# Patient Record
(demographics unavailable — no encounter records)

---

## 2024-10-23 NOTE — PHYSICAL EXAM
[Well-appearing] : well-appearing [No dysmorphic facial features] : no dysmorphic facial features [No ocular abnormalities] : no ocular abnormalities [Neck supple] : neck supple [Soft] : soft [No organomegaly] : no organomegaly [Straight] : straight [No adilene or dimples] : no adilene or dimples [No deformities] : no deformities [Alert] : alert [Well related, good eye contact] : well related, good eye contact [Pupils reactive to light] : pupils reactive to light [Turns to light] : turns to light [Tracks face, light or objects with full extraocular movements] : tracks face, light or objects with full extraocular movements [Responds to touch on face] : responds to touch on face [No facial asymmetry or weakness] : no facial asymmetry or weakness [No nystagmus] : no nystagmus [Responds to voice/sounds] : responds to voice/sounds [Good shoulder shrug] : good shoulder shrug [Midline tongue] : midline tongue [No fasciculations] : no fasciculations [Normal axial and appendicular muscle tone with symmetric limb movements] : normal axial and appendicular muscle tone with symmetric limb movements [Normal bulk] : normal bulk [Reaches for toys and or gives high five] : reaches for toys and or gives high five [Good  bilaterally] : good  bilaterally [5/5 strength in proximal and distal muscles of arms and legs] : 5/5 strength in proximal and distal muscles of arms and legs [No abnormal involuntary movements] : no abnormal involuntary movements [Walks well for age] : walks well for age [Good stoop and recover] : good stoop and recover [2+ biceps] : 2+ biceps [Triceps] : triceps [Knee jerks] : knee jerks [Responds to touch and tickle] : responds to touch and tickle [No dysmetria in reaching for objects and or on FTNT] : no dysmetria in reaching for objects and or on FTNT [Good standing and or walking balance for age, no ataxia] : good standing and or walking balance for age, no ataxia [de-identified] : Small head circumference  [de-identified] : Anxious but easily soothed by Mom or Grandmom. Playful with both, smiling appropriately. [de-identified] : Nonverbal. Occasional humming to himself. Follows directions in Citizen of the [place] including identifying body parts.

## 2024-10-23 NOTE — ASSESSMENT
[FreeTextEntry1] : 2 year old with Expressive language delay and exam significant for baseline small head size. Discussed with family that further workup recommended. This would include MRI brain to assess for structural etiology of small head size and speech delay. VEEG to assess for nonconvulsive seizure or LandauKleffner that could contribute to expressive language delay. Genetic testing. Mom is comfortable with completed blood work but does not want to pursue imaging or EEG at this time.  1. Chromosomal studies, Microarray to be sent today 2. Recommend formal Audiology testing

## 2024-10-23 NOTE — HISTORY OF PRESENT ILLNESS
[FreeTextEntry1] : Tonny presents with Mom and Grandmom in follow up. He remains nonverbal though continues to advance with his receptive language. He makes sounds to himself and does not babble. He is not pointing or gesturing. He does point to some body parts and mimics animal sounds when asked. Socially they feel his eye contact is inconsistent. He does play with toys and shares in his play. He likes looking through picture books and pointing at items. Motor milestones are normal. He does not have any repetitive behaviors.

## 2024-10-23 NOTE — REASON FOR VISIT
[Follow-Up Evaluation] : a follow-up evaluation for [Mother] : mother [Family Member] : family member

## 2025-01-17 NOTE — ASSESSMENT
[FreeTextEntry1] : 2 year old with improved head circumference but persistent expressive language delay. Will continue with consistent Speech therapy services

## 2025-01-17 NOTE — HISTORY OF PRESENT ILLNESS
[FreeTextEntry1] : Tonny presents in follow-up in the presence of grandmother.  Grandmom states that Tonny over the last 2 weeks or so has started to say some single words in [place] only.  He continues to improve with his receptive language.  He is playful and interactive. He has not developed any usual extremity movements.  Lab work including chromosomal studies completed following the last visit and previously discussed with mom via telephone.  MRI brain not completed due to mom's discomfort about his needing sedation.

## 2025-01-17 NOTE — PHYSICAL EXAM
[Well-appearing] : well-appearing [Normocephalic] : normocephalic [No dysmorphic facial features] : no dysmorphic facial features [No ocular abnormalities] : no ocular abnormalities [Neck supple] : neck supple [Soft] : soft [No organomegaly] : no organomegaly [Straight] : straight [No adilene or dimples] : no adilene or dimples [No deformities] : no deformities [Alert] : alert [Well related, good eye contact] : well related, good eye contact [Pupils reactive to light] : pupils reactive to light [Turns to light] : turns to light [Tracks face, light or objects with full extraocular movements] : tracks face, light or objects with full extraocular movements [Responds to touch on face] : responds to touch on face [No facial asymmetry or weakness] : no facial asymmetry or weakness [No nystagmus] : no nystagmus [Responds to voice/sounds] : responds to voice/sounds [Good shoulder shrug] : good shoulder shrug [Midline tongue] : midline tongue [No fasciculations] : no fasciculations [Normal axial and appendicular muscle tone with symmetric limb movements] : normal axial and appendicular muscle tone with symmetric limb movements [Normal bulk] : normal bulk [Reaches for toys and or gives high five] : reaches for toys and or gives high five [Good  bilaterally] : good  bilaterally [5/5 strength in proximal and distal muscles of arms and legs] : 5/5 strength in proximal and distal muscles of arms and legs [No abnormal involuntary movements] : no abnormal involuntary movements [Walks well for age] : walks well for age [Good stoop and recover] : good stoop and recover [2+ biceps] : 2+ biceps [Triceps] : triceps [Knee jerks] : knee jerks [Responds to touch and tickle] : responds to touch and tickle [No dysmetria in reaching for objects and or on FTNT] : no dysmetria in reaching for objects and or on FTNT [Good standing and or walking balance for age, no ataxia] : good standing and or walking balance for age, no ataxia [de-identified] : Anxious at baseline intermittently interactive [de-identified] : Nonverbal. Follows directions in [place]

## 2025-06-13 NOTE — CONSULT LETTER
[Dear  ___] : Dear  [unfilled], [Consult Letter:] : I had the pleasure of evaluating your patient, [unfilled]. [Please see my note below.] : Please see my note below. [Consult Closing:] : Thank you very much for allowing me to participate in the care of this patient.  If you have any questions, please do not hesitate to contact me. [Sincerely,] : Sincerely, [FreeTextEntry2] : Dr Quinonez [FreeTextEntry3] : Destiny Aguilera MD Pediatric Hematology/Oncology [place] [phone]

## 2025-06-13 NOTE — REASON FOR VISIT
[New Patient/Consultation] : a new patient/consultation for [Mother] : mother [FreeTextEntry2] : microcytic anemia

## 2025-06-13 NOTE — CONSULT LETTER
[Dear  ___] : Dear  [unfilled], [Consult Letter:] : I had the pleasure of evaluating your patient, [unfilled]. [Please see my note below.] : Please see my note below. [Consult Closing:] : Thank you very much for allowing me to participate in the care of this patient.  If you have any questions, please do not hesitate to contact me. [Sincerely,] : Sincerely, [FreeTextEntry2] : Dr Quinonez [FreeTextEntry3] : Destiny Aguilera MD Pediatric Hematology/Oncology [place] [phone]

## 2025-06-13 NOTE — PAST MEDICAL HISTORY
[At Term] : at term [[place] [ Section] : by  section [None] : there were no delivery complications [Jaundice] :  jaundice [Phototherapy] : phototherapy [Occupational Therapy] : occupational therapy [Speech Therapy] : speech therapy [Transfusion] : no transfusion [NICU] : no NICU [FreeTextEntry4] : emergency csection - mom had eclampsia, baby went into distress, +jaundice at birth, phototherapy x2 days  [FreeTextEntry3] : IE services for speech, OT

## 2025-06-13 NOTE — REVIEW OF SYSTEMS
[Pallor] : pallor [Fever] : no fever [Decreased Appetite] : normal appetite [Fatigue] : no fatigue [Weakness] : no weakness [Weight Change] : no weight change [Rash] : no rash [Petechiae] : no petechiae [Ecchymoses] : no ecchymoses [Jaundice] : no jaundice [Icterus] : no icterus [Nasal Discharge] : no nasal discharge [Epistaxis] : no epistaxis [Mouth Ulcers] : no mouth ulcers [Bleeding] : no bleeding [Bruising] : no bruising [Adenopathy] : no adenopathy [Frequent Infections] : no frequent infections [Cough] : no cough [Murmur] : no murmur [Abdominal Pain] : no abdominal pain [Nausea] : no nausea [Emesis] : no emesis [Constipation] : no constipation [Diarrhea] : no diarrhea [Hematuria] : no hematuria [Joint Pain] : no joint pain [Joint Swelling] : no joint swelling [Headache] : no headache [Zhao] : not zhao [Irritable] : not irritable [FreeTextEntry1] : mild anemia noted in the past

## 2025-06-13 NOTE — HISTORY OF PRESENT ILLNESS
[No Feeding Issues] : no feeding issues at this time [de-identified] : This is an initial consult visit for this 3 y/o male referred by pediatrician for evaluation of microcytic anemia.  Mother states that patient had bloodwork done for routine well visit at one year of age and reports that she was told that hgb and mcv were low.  States that patient took MVI with iron, twice (over the past year) for less than a month at a time, and repeat lab work showed similar results.  Mother states that patient has been well.  Denies recent fever, illness, fatigue, abdominal pain, vomiting or diarrhea.  Denies abnormal bruising or bleeding.  No h/o rash, joint pain or joint swelling.  Reports good appetite and good energy level.  Eats a varied diet including meats, fruits and vegetables.    Meds: none  Psx: hypospadias repair

## 2025-06-13 NOTE — END OF VISIT
[FreeTextEntry3] : Patient seen and examined. 3 yo referred for microcytic anemia.  Has taken mvi with iron intermittently in the past without change in values per mother.  Will check iron studies, cbc,retic, hgb elec and alpha gene mutation.  Grandma requested to check some vitamin levels as well. If iron deficient, may not have fully replenished iron stores with duration and dose of iron supplement.  Labs today and f/u in 1-2 weeks to review lab results and for further treatment planning as needed. >50% encounter by attending.

## 2025-06-13 NOTE — END OF VISIT
[FreeTextEntry3] : Patient seen and examined. 1 yo referred for microcytic anemia.  Has taken mvi with iron intermittently in the past without change in values per mother.  Will check iron studies, cbc,retic, hgb elec and alpha gene mutation.  Grandma requested to check some vitamin levels as well. If iron deficient, may not have fully replenished iron stores with duration and dose of iron supplement.  Labs today and f/u in 1-2 weeks to review lab results and for further treatment planning as needed. >50% encounter by attending.

## 2025-06-13 NOTE — HISTORY OF PRESENT ILLNESS
[No Feeding Issues] : no feeding issues at this time [de-identified] : This is an initial consult visit for this 1 y/o male referred by pediatrician for evaluation of microcytic anemia.  Mother states that patient had bloodwork done for routine well visit at one year of age and reports that she was told that hgb and mcv were low.  States that patient took MVI with iron, twice (over the past year) for less than a month at a time, and repeat lab work showed similar results.  Mother states that patient has been well.  Denies recent fever, illness, fatigue, abdominal pain, vomiting or diarrhea.  Denies abnormal bruising or bleeding.  No h/o rash, joint pain or joint swelling.  Reports good appetite and good energy level.  Eats a varied diet including meats, fruits and vegetables.    Meds: none  Psx: hypospadias repair

## 2025-06-13 NOTE — HISTORY OF PRESENT ILLNESS
[No Feeding Issues] : no feeding issues at this time [de-identified] : This is an initial consult visit for this 3 y/o male referred by pediatrician for evaluation of microcytic anemia.  Mother states that patient had bloodwork done for routine well visit at one year of age and reports that she was told that hgb and mcv were low.  States that patient took MVI with iron, twice (over the past year) for less than a month at a time, and repeat lab work showed similar results.  Mother states that patient has been well.  Denies recent fever, illness, fatigue, abdominal pain, vomiting or diarrhea.  Denies abnormal bruising or bleeding.  No h/o rash, joint pain or joint swelling.  Reports good appetite and good energy level.  Eats a varied diet including meats, fruits and vegetables.    Meds: none  Psx: hypospadias repair

## 2025-06-13 NOTE — CONSULT LETTER
[Dear  ___] : Dear  [unfilled], [Consult Letter:] : I had the pleasure of evaluating your patient, [unfilled]. [Please see my note below.] : Please see my note below. [Consult Closing:] : Thank you very much for allowing me to participate in the care of this patient.  If you have any questions, please do not hesitate to contact me. [Sincerely,] : Sincerely, [FreeTextEntry2] : Dr Quinonez [FreeTextEntry3] : Destiny Aguilera MD Pediatric Hematology/Oncology [place] [phone]

## 2025-07-03 NOTE — REASON FOR VISIT
[Follow-Up Visit] : a follow-up visit for [Iron Deficiency Anemia] : iron deficiency anemia [Mother] : mother [Family Member] : family member

## 2025-07-04 NOTE — END OF VISIT
[FreeTextEntry3] : pt seen and examined.  reviewed labs.  pt has not yet started iron supplement. reviewed dosing with mother and recommendation for compliance with plan.  f/u 1 month or sooner with any concerns. >50% encounter by attending [Time Spent: ___ minutes] : I have spent [unfilled] minutes of time on the encounter which excludes teaching and separately reported services.

## 2025-07-04 NOTE — CONSULT LETTER
[Dear  ___] : Dear  [unfilled], [Courtesy Letter:] : I had the pleasure of seeing your patient, [unfilled], in my office today. [Please see my note below.] : Please see my note below. [Consult Closing:] : Thank you very much for allowing me to participate in the care of this patient.  If you have any questions, please do not hesitate to contact me. [Sincerely,] : Sincerely, [FreeTextEntry2] : Dr Quinonez [FreeTextEntry3] : Destiny Aguilera MD Pediatric Hematology/Oncology [place] [phone]

## 2025-07-04 NOTE — HISTORY OF PRESENT ILLNESS
[No Feeding Issues] : no feeding issues at this time [de-identified] : 3 y/o male with iron deficiency anemia here in clinic today for scheduled visit.  Mother states that she did not start the iron that was prescribed after last visit.  States that when she went to pick it up from pharmacy she was told that the dose was too strong of a dose for patient's age.  It was reviewed with parent in clinic today that the dose was correct for patient's weight and for treatment of iron deficiency anemia.  Mother reports that Tonny has been well.  Denies fever or recent illness.  Continues to have a good appetite and good energy level.  No reports of abnormal bruising or bleeding.  No acute concerns.

## 2025-07-04 NOTE — CONSULT LETTER
[Dear  ___] : Dear  [unfilled], [Courtesy Letter:] : I had the pleasure of seeing your patient, [unfilled], in my office today. [Please see my note below.] : Please see my note below. [Consult Closing:] : Thank you very much for allowing me to participate in the care of this patient.  If you have any questions, please do not hesitate to contact me. [Sincerely,] : Sincerely, [FreeTextEntry2] : Dr Quinonez [FreeTextEntry3] : Destiny Aguilera MD Pediatric Hematology/Oncology [place] [phone]

## 2025-07-04 NOTE — HISTORY OF PRESENT ILLNESS
[No Feeding Issues] : no feeding issues at this time [de-identified] : 3 y/o male with iron deficiency anemia here in clinic today for scheduled visit.  Mother states that she did not start the iron that was prescribed after last visit.  States that when she went to pick it up from pharmacy she was told that the dose was too strong of a dose for patient's age.  It was reviewed with parent in clinic today that the dose was correct for patient's weight and for treatment of iron deficiency anemia.  Mother reports that Tonny has been well.  Denies fever or recent illness.  Continues to have a good appetite and good energy level.  No reports of abnormal bruising or bleeding.  No acute concerns.

## 2025-07-29 NOTE — HISTORY OF PRESENT ILLNESS
[de-identified] : This is a scheduled visit for this 3 y/o male with MALINI.  Patient prescribed Novaferrum 3ml daily.  Mother states that patient has been tolerating supplement.  Notes that he had been taking it daily however they were on vacation last week and he missed about 5 doses out of the week.  Has been well. Denies recent fever or illness.  No reports of abdominal pain, vomiting or diarrhea.  No unusual bruising or bleeding.  Eating well and reports good energy level.  No acute concerns.  [No Feeding Issues] : no feeding issues at this time

## 2025-07-29 NOTE — HISTORY OF PRESENT ILLNESS
[de-identified] : This is a scheduled visit for this 3 y/o male with MALINI.  Patient prescribed Novaferrum 3ml daily.  Mother states that patient has been tolerating supplement.  Notes that he had been taking it daily however they were on vacation last week and he missed about 5 doses out of the week.  Has been well. Denies recent fever or illness.  No reports of abdominal pain, vomiting or diarrhea.  No unusual bruising or bleeding.  Eating well and reports good energy level.  No acute concerns.  [No Feeding Issues] : no feeding issues at this time

## 2025-07-29 NOTE — REVIEW OF SYSTEMS
[Fever] : no fever [Fatigue] : no fatigue [Weakness] : no weakness [Weight Change] : no weight change [Petechiae] : no petechiae [Ecchymoses] : no ecchymoses [Jaundice] : no jaundice [Icterus] : no icterus [Nasal Discharge] : no nasal discharge [Epistaxis] : no epistaxis [Sore Throat] : no sore throat [Mouth Ulcers] : no mouth ulcers [Pallor] : no pallor [Bleeding] : no bleeding [Bruising] : no bruising [Adenopathy] : no adenopathy [Anemia] : no anemia [Dyspnea] : no dyspnea [Cough] : no cough [Stridor] : no stridor [Emesis] : no emesis [Constipation] : no constipation [Diarrhea] : no diarrhea [Dysuria] : no dysuria [Hematuria] : no hematuria [Joint Pain] : no joint pain [Joint Swelling] : no joint swelling [Irritable] : not irritable